# Patient Record
Sex: MALE | Race: WHITE
[De-identification: names, ages, dates, MRNs, and addresses within clinical notes are randomized per-mention and may not be internally consistent; named-entity substitution may affect disease eponyms.]

---

## 2020-02-18 ENCOUNTER — HOSPITAL ENCOUNTER (EMERGENCY)
Dept: HOSPITAL 9 - MADERS | Age: 40
Discharge: HOME | End: 2020-02-18
Payer: COMMERCIAL

## 2020-02-18 DIAGNOSIS — F17.210: ICD-10-CM

## 2020-02-18 DIAGNOSIS — N13.2: Primary | ICD-10-CM

## 2020-02-18 LAB
BACTERIA UR QL AUTO: (no result) HPF
PROT UR STRIP.AUTO-MCNC: 30 MG/DL
WBC UR QL AUTO: (no result) HPF (ref 0–3)

## 2020-02-18 PROCEDURE — 81015 MICROSCOPIC EXAM OF URINE: CPT

## 2020-02-18 PROCEDURE — 74176 CT ABD & PELVIS W/O CONTRAST: CPT

## 2020-02-18 PROCEDURE — 81003 URINALYSIS AUTO W/O SCOPE: CPT

## 2020-02-18 NOTE — CT
CT ABDOMEN AND PELVIS PERFORMED WITHOUT CONTRAST ENHANCEMENT:

2/18/209

 

HISTORY: 

Left flank pain. 

 

The lung bases are clear. 

 

The liver, spleen, pancreas, and gallbladder regions appear unremarkable given the limitations of a n
oncontrast study. 

 

Right and left adrenal glands are normal in appearance. Punctate bilateral renal calculi are seen and
 left sided hydronephrosis and hydroureter is present to the L4-5 level where there is an approximate
ly 6 mm calculus present. The ureter below this level is nondilated. No evidence of any periaortic or
 mesenteric adenopathy. No pelvic lymphadenopathy or mass. The appendix region appears unremarkable. 


 

IMPRESSION: 

1.      Bilateral punctate renal calculi. 

2.      6 mm mid left ureteral calculus located at the L4-5 level associated with mild left sided hyd
ronephrosis and hydroureter. 

 

POS: [facility]MAN[facility]